# Patient Record
Sex: FEMALE | Race: WHITE
[De-identification: names, ages, dates, MRNs, and addresses within clinical notes are randomized per-mention and may not be internally consistent; named-entity substitution may affect disease eponyms.]

---

## 2017-11-06 ENCOUNTER — HOSPITAL ENCOUNTER (EMERGENCY)
Dept: HOSPITAL 17 - PHED | Age: 27
Discharge: HOME | End: 2017-11-06
Payer: COMMERCIAL

## 2017-11-06 VITALS
DIASTOLIC BLOOD PRESSURE: 107 MMHG | RESPIRATION RATE: 20 BRPM | TEMPERATURE: 98.3 F | HEART RATE: 100 BPM | SYSTOLIC BLOOD PRESSURE: 156 MMHG | OXYGEN SATURATION: 94 %

## 2017-11-06 VITALS — HEIGHT: 64 IN | BODY MASS INDEX: 30.15 KG/M2 | WEIGHT: 176.59 LBS

## 2017-11-06 VITALS
DIASTOLIC BLOOD PRESSURE: 107 MMHG | RESPIRATION RATE: 20 BRPM | TEMPERATURE: 98.3 F | OXYGEN SATURATION: 94 % | HEART RATE: 100 BPM | SYSTOLIC BLOOD PRESSURE: 156 MMHG

## 2017-11-06 VITALS
HEART RATE: 89 BPM | SYSTOLIC BLOOD PRESSURE: 158 MMHG | DIASTOLIC BLOOD PRESSURE: 95 MMHG | RESPIRATION RATE: 20 BRPM | OXYGEN SATURATION: 95 %

## 2017-11-06 DIAGNOSIS — J40: Primary | ICD-10-CM

## 2017-11-06 DIAGNOSIS — F17.210: ICD-10-CM

## 2017-11-06 PROCEDURE — 87804 INFLUENZA ASSAY W/OPTIC: CPT

## 2017-11-06 PROCEDURE — 99284 EMERGENCY DEPT VISIT MOD MDM: CPT

## 2017-11-06 PROCEDURE — 87081 CULTURE SCREEN ONLY: CPT

## 2017-11-06 PROCEDURE — 87880 STREP A ASSAY W/OPTIC: CPT

## 2017-11-06 NOTE — PD
HPI


Chief Complaint:  fever


Time Seen by Provider:  06:35


Travel History


International Travel<30 days:  No


Contact w/Intl Traveler<30days:  No


Traveled to known affect area:  No





History of Present Illness


HPI


27 year-old female presents to the emergency department for one week of cough 

sore throat sinus pressure earache and congestion.  Patient is concerned she 

has the flu.  Due to persistent symptoms she decided to come to the emergency 

room for evaluation.  No report of headache neck stiffness chest pain abdominal 

pain vomiting diarrhea dysuria or flank pain myalgias or arthralgias.  Last 

menses was one month ago and denies pregnancy.  Patient is use over-the-counter 

cold preparations without relief of symptoms.





PFSH


Past Medical History


*** Narrative Medical


denies significant past medical history surgical history; admits to occasional 

alcohol use and tobacco use; nursing notes reviewed


Diminished Hearing:  No





Social History


Alcohol Use:  Yes (weekends)


Tobacco Use:  Yes (1/2 PACK PER DAY)


Substance Use:  No





Allergies-Medications


(Allergen,Severity, Reaction):  


Coded Allergies:  


     No Known Allergies (Verified  Adverse Reaction, Unknown, 11/6/17)


Reported Meds & Prescriptions





Reported Meds & Active Scripts


Active


Ventolin Hfa 18 GM Inh (Albuterol Sulfate) 90 Mcg/Act Aer 2 Puff INH Q4-6H PRN


Medrol Dosepak (Methylprednisolone) 4 Mg Dspk 4 Mg PO AS DIRECTED


     Per Pharmacist direction


Zithromax Z-Nolberto (Azithromycin) 250 Mg Dspk 250 Mg PO AS DIRECTED


     500 MG (2 tabs) day 1, then 1 tab days 2-5.








Review of Systems


Except as stated in HPI:  all other systems reviewed are Neg


General / Constitutional:  Positive: Fever, Chills


HENT:  Positive: Sore Throat, Congestion, Earache, No: Neck Stiffness, Neck Pain


Cardiovascular:  No: Chest Pain or Discomfort


Respiratory:  Positive: Cough, No: Shortness of Breath, Wheezing


Gastrointestinal:  No: Nausea, Vomiting, Abdominal Pain


Genitourinary:  No: Frequency, Dysuria, Flank Pain


Musculoskeletal:  Positive: Myalgias, Arthralgias, No: Weakness


Skin:  No Rash


Neurologic:  No: Weakness


Psychiatric:  No: Anxiety


Hematologic/Lymphatic:  No: Lymph Node Enlargement





Physical Exam


Narrative


GENERAL: Well-developed well-nourished female in no acute distress no 

respiratory distress


SKIN: Warm and dry.


HEAD: Normocephalic.


EYES: No scleral icterus. No injection or drainage.  ENT: Mucous membranes 

moist airway is patent posterior pharynx mild erythema without exudate of 

change or edema


NECK: Supple, trachea midline. No JVD or lymphadenopathy.  No meningismus no 

nuchal rigidity


CARDIOVASCULAR: Regular rate and rhythm without murmurs, gallops, or rubs. 


RESPIRATORY: Breath sounds equal bilaterally. No accessory muscle use.


GASTROINTESTINAL: Abdomen soft, non-tender, nondistended. 


MUSCULOSKELETAL: No cyanosis, or edema. 


BACK: Nontender without obvious deformity. No CVA tenderness.








Data


Data


Last Documented VS





Vital Signs








  Date Time  Temp Pulse Resp B/P (MAP) Pulse Ox O2 Delivery O2 Flow Rate FiO2


 


11/6/17 06:47  89 20 158/95 (116) 95 Room Air  


 


11/6/17 06:34 98.3       








Orders





 Orders


Group A Rapid Strep Screen (11/6/17 06:35)


Influenzae A/B Antigen (11/6/17 06:35)








MDM


Medical Decision Making


Medical Screen Exam Complete:  Yes


Emergency Medical Condition:  Yes


Medical Record Reviewed:  Yes


Differential Diagnosis


Viral syndrome, bronchitis, pneumonia, sinusitis, influenza, pharyngitis


Narrative Course


Specimens collected for influenza and for rapid strep antigen


Patient rating on lab results





Diagnosis





 Primary Impression:  


 Bronchitis


Referrals:  


Pediatrician


call for appointment





***Additional Instructions:  


Increase fluid hydration


Take acetaminophen/Tylenol every 4 hours for fever 100.4F or greater


Take ibuprofen/Advil/Motrin every 6-8 hours as needed for fever 100.4F or 

greater


The course of antibiotic as prescribed [place] complete course of steroid taper


Use inhaler as needed for wheezing


Discontinue tobacco use


Return to the emergency department for any concerns or change in condition


***Med/Other Pt SpecificInfo:  Prescription(s) given


Scripts


Albuterol 18 GM Inh (Ventolin Hfa 18 GM Inh) 90 Mcg/Act Aer


2 PUFF INH Q4-6H Y for SHORTNESS OF BREATH, #1 INHALER 0 Refills


   Prov: Irene Tate MD         11/6/17 


Methylprednisolone Dosepak (Medrol Dosepak) 4 Mg Dspk


4 MG PO AS DIRECTED, #1 DSPK 0 Refills


   Per Pharmacist direction


   Prov: Irene Tate MD         11/6/17 


Azithromycin (Zithromax Z-Nolberto) 250 Mg Dspk


250 MG PO AS DIRECTED for Infection, #1 DSPK 0 Refills


   500 MG (2 tabs) day 1, then 1 tab days 2-5.


   Prov: Irene Tate MD         11/6/17











Irene Tate MD Nov 6, 2017 06:48

## 2018-03-03 ENCOUNTER — HOSPITAL ENCOUNTER (EMERGENCY)
Dept: HOSPITAL 17 - PHEFT | Age: 28
Discharge: HOME | End: 2018-03-03
Payer: COMMERCIAL

## 2018-03-03 VITALS
RESPIRATION RATE: 16 BRPM | HEART RATE: 89 BPM | SYSTOLIC BLOOD PRESSURE: 155 MMHG | TEMPERATURE: 98.5 F | DIASTOLIC BLOOD PRESSURE: 95 MMHG | OXYGEN SATURATION: 96 %

## 2018-03-03 VITALS — HEIGHT: 64 IN | BODY MASS INDEX: 29.73 KG/M2 | WEIGHT: 174.17 LBS

## 2018-03-03 DIAGNOSIS — J40: ICD-10-CM

## 2018-03-03 DIAGNOSIS — J06.9: Primary | ICD-10-CM

## 2018-03-03 DIAGNOSIS — F17.200: ICD-10-CM

## 2018-03-03 PROCEDURE — 99283 EMERGENCY DEPT VISIT LOW MDM: CPT

## 2018-03-03 NOTE — PD
HPI


Chief Complaint:  Cold / Flu Symptoms


Time Seen by Provider:  15:10


Travel History


International Travel<30 days:  No


Contact w/Intl Traveler<30days:  No


Traveled to known affect area:  No





History of Present Illness


HPI


27-year-old female that presents to the ED for evaluation of cold like symptoms 

for the past 3 days.  No sick contacts.  Congestion and cough and sinus 

pressure.  Denies any chest pain or shortness of breath.  She does have a 

history of bronchitis in the past and states that he usually starts like this.  

She has a history of smoking but no asthma.  No COPD.  No allergies to 

medication.  Has been taking OTC meds with minimal relief.  Main concern for 

the symptoms are not getting better.  Cough is productive.  No fevers chills or 

sweats.  No other medical issues.





PFSH


Past Medical History


Diminished Hearing:  No


Pregnant?:  Not Pregnant


LMP:  02/19/18





Social History


Alcohol Use:  Yes (weekends)


Tobacco Use:  Yes (1/2 PACK PER DAY)


Substance Use:  No





Allergies-Medications


(Allergen,Severity, Reaction):  


Coded Allergies:  


     No Known Allergies (Verified  Adverse Reaction, Unknown, 3/3/18)


Reported Meds & Prescriptions





Reported Meds & Active Scripts


Active


Tessalon Perles (Benzonatate) 100 Mg Cap 100 Mg PO TID PRN


Zithromax Z-Nolberto (Azithromycin) 250 Mg Dspk 250 Mg PO AS DIRECTED


     500 MG (2 tabs) day 1, then 1 tab days 2-5.


Medrol Dosepak (Methylprednisolone) 4 Mg Dspk 4 Mg PO AS DIRECTED


     Per Pharmacist direction








Review of Systems


Except as stated in HPI:  all other systems reviewed are Neg





Physical Exam


Narrative


GENERAL: Well-nourished, well-developed patient in no apparent distress.


SKIN: Warm and dry.


HEAD: Atraumatic. Normocephalic. 


EYES: Pupils equal and round reactive to light and accommodation.  No scleral 

icterus. No injection or drainage.  


ENT: No nasal bleeding or discharge.  Mucous membranes pink and moist.  TMs are 

clear with no sign of infection or perforation.  No mastoid tenderness.  Ear 

canals are intact bilaterally.  No lymphadenopathy.  Nostril mucosa is red and 

moist with clear mucus noted.  No sinus tenderness to palpation noted.  Tonsils 

are not enlarged or swollen. No ulvua Deviation.  Tongue is midline.


NECK: Trachea midline. No JVD.  No meningeal signs noted


CARDIOVASCULAR: Regular rate and rhythm.  


RESPIRATORY: No accessory muscle use. Clear to auscultation. Breath sounds 

equal bilaterally. 


GASTROINTESTINAL: Abdomen soft, non-tender, nondistended. Hepatic and splenic 

margins not palpable. 


MUSCULOSKELETAL: Extremities without clubbing, cyanosis, or edema. No obvious 

deformities. 


NEUROLOGICAL: Awake and alert. No obvious cranial nerve deficits.  Motor 

grossly within normal limits. Five out of 5 muscle strength in the arms and 

legs.  Normal speech.


PSYCHIATRIC: Appropriate mood and affect; insight and judgment normal.





Data


Data


Last Documented VS





Vital Signs








  Date Time  Temp Pulse Resp B/P (MAP) Pulse Ox O2 Delivery O2 Flow Rate FiO2


 


3/3/18 15:01 98.5 89 16 155/95 (115) 96   








Orders





 Orders


Ed Discharge Order (3/3/18 15:21)








[facility]


Medical Decision Making


Medical Screen Exam Complete:  Yes


Emergency Medical Condition:  Yes


Medical Record Reviewed:  Yes


Differential Diagnosis


Bronchitis versus sinusitis versus URI


Narrative Course


27-year-old female that presents to the ED for evaluation of cold like 

symptoms.  Patient was properly examined and was found to have signs and 

symptoms consistent with appears to be URI likely possible bronchitis.  Will 

treat with azithromycin, Tessalon Perles and Medrol Dosepak.  Told to follow 

with PCP.  See ED worsening symptoms.  Take OTC meds as needed.





Diagnosis





 Primary Impression:  


 URI (upper respiratory infection)


 Qualified Codes:  J06.9 - Acute upper respiratory infection, unspecified


 Additional Impression:  


 Bronchitis


Patient Instructions:  General Instructions





***Additional Instructions:  


Motrin and Tylenol for pain and fever.


You can use over-the-counter antihistamine as well as well as Mucinex as needed 

for runny nose and congestion.


Cough drops for cough as needed.


Drink plenty of fluids.


Follow-up with PCP.


See ED for worsening symptoms.


***Med/Other Pt SpecificInfo:  Prescription(s) given


Scripts


Benzonatate (Tessalon Perles) 100 Mg Cap


100 MG PO TID Y for COUGH, #20 CAP 0 Refills


   Prov: Yoan Barrow MD         3/3/18 


Azithromycin (Zithromax Z-Nolberto) 250 Mg Dspk


250 MG PO AS DIRECTED for Infection, #1 DSPK 0 Refills


   500 MG (2 tabs) day 1, then 1 tab days 2-5.


   Prov: Yoan Barrow MD         3/3/18 


Methylprednisolone Dosepak (Medrol Dosepak) 4 Mg Dspk


4 MG PO AS DIRECTED, #1 DSPK 0 Refills


   Per Pharmacist direction


   Prov: Yoan Barrow MD         3/3/18


Disposition:  01 DISCHARGE HOME


Condition:  Stable











Hal Vazquez Mar 3, 2018 15:24

## 2018-04-15 ENCOUNTER — HOSPITAL ENCOUNTER (EMERGENCY)
Dept: HOSPITAL 54 - ER | Age: 28
Discharge: HOME | End: 2018-04-15
Payer: MEDICAID

## 2018-04-15 VITALS — DIASTOLIC BLOOD PRESSURE: 76 MMHG | SYSTOLIC BLOOD PRESSURE: 138 MMHG

## 2018-04-15 VITALS — WEIGHT: 200 LBS | HEIGHT: 64 IN | BODY MASS INDEX: 34.15 KG/M2

## 2018-04-15 DIAGNOSIS — R07.89: Primary | ICD-10-CM

## 2018-04-15 PROCEDURE — 71045 X-RAY EXAM CHEST 1 VIEW: CPT

## 2018-04-15 PROCEDURE — 93005 ELECTROCARDIOGRAM TRACING: CPT

## 2018-04-15 PROCEDURE — Z7610: HCPCS

## 2018-04-15 PROCEDURE — 99285 EMERGENCY DEPT VISIT HI MDM: CPT

## 2018-04-15 PROCEDURE — 84703 CHORIONIC GONADOTROPIN ASSAY: CPT

## 2018-04-15 PROCEDURE — A4606 OXYGEN PROBE USED W OXIMETER: HCPCS
